# Patient Record
Sex: MALE | Race: OTHER | NOT HISPANIC OR LATINO | URBAN - METROPOLITAN AREA
[De-identification: names, ages, dates, MRNs, and addresses within clinical notes are randomized per-mention and may not be internally consistent; named-entity substitution may affect disease eponyms.]

---

## 2020-12-28 ENCOUNTER — OFFICE (OUTPATIENT)
Dept: URBAN - METROPOLITAN AREA TELEHEALTH 12 | Facility: TELEHEALTH | Age: 40
End: 2020-12-28
Payer: MEDICAID

## 2020-12-28 VITALS — WEIGHT: 160 LBS | HEIGHT: 71 IN

## 2020-12-28 DIAGNOSIS — K92.2 GASTROINTESTINAL HEMORRHAGE, UNSPECIFIED: ICD-10-CM

## 2020-12-28 DIAGNOSIS — K21.00 GASTRO-ESOPHAGEAL REFLUX DISEASE WITH ESOPHAGITIS, WITHOUT B: ICD-10-CM

## 2020-12-28 DIAGNOSIS — D64.9 ANEMIA, UNSPECIFIED: ICD-10-CM

## 2020-12-28 DIAGNOSIS — R07.89 OTHER CHEST PAIN: ICD-10-CM

## 2020-12-28 DIAGNOSIS — R10.12 LEFT UPPER QUADRANT PAIN: ICD-10-CM

## 2020-12-28 PROCEDURE — 99203 OFFICE O/P NEW LOW 30 MIN: CPT | Mod: 95 | Performed by: INTERNAL MEDICINE

## 2020-12-28 RX ORDER — ESOMEPRAZOLE MAGNESIUM 40 MG/1
CAPSULE, DELAYED RELEASE ORAL
Qty: 30 | Refills: 5 | Status: ACTIVE
Start: 2020-12-28

## 2020-12-28 NOTE — SERVICENOTES
Visit was 30 min long, Patient's visit was conducted through video telecommunication. Patient consented before the start of visit as to understanding of privacy concerns, possible technological failure, and their responsibility of carrying out instructions of plan.

## 2020-12-28 NOTE — SERVICEHPINOTES
PATIENT VERIFIED BY DATE OF BIRTH AND NAME. Patient has been consented for this telecommunication visit. Describes 4 months of intermittent chest pain, started 3 months ago.   TRigger foods include coffee, soda which tends to bring on the chest pain.  He then feels like he has shortness of breath and then he feels like he has to "yawn," which tends to help with his breathing.  He also feels a numbness in his arms.  Says he has luq pain which happens intermittently.  His heartburn and luq pain is bought on by caffeine, chocolate, mint and spicy foods which he avoids.  He was boxing with a friend and sustained a rib fracture foundon xray through visit to ER for left sided chest pain. Due to ongong pain returned to ER on 12/13, chest xray neg for acute process, labs show hgb of 12.8 (normal 13.5-17.5) with normal MCV.  No dysphagia, unintentional weight loss, nausea or emesis. He also describes intermittent palpitations of his heart- reports seeing a cardiologist and says has had a sleep study and "breathing test," done through [employer], he is not sure he had a stress test done- thinks it was Dr. Shelley who he saw. Notes blood on his toilet paper over the last few years.  Describes normal brown movements for the most part, no diarrhea or constipation. No previous EGD or colonoscopy.

## 2021-02-08 ENCOUNTER — ON CAMPUS - OUTPATIENT (OUTPATIENT)
Dept: URBAN - METROPOLITAN AREA HOSPITAL 16 | Facility: HOSPITAL | Age: 41
End: 2021-02-08
Payer: MEDICAID

## 2021-02-08 DIAGNOSIS — K92.2 GASTROINTESTINAL HEMORRHAGE, UNSPECIFIED: ICD-10-CM

## 2021-02-08 DIAGNOSIS — R07.89 OTHER CHEST PAIN: ICD-10-CM

## 2021-02-08 DIAGNOSIS — K21.00 GASTRO-ESOPHAGEAL REFLUX DISEASE WITH ESOPHAGITIS, WITHOUT B: ICD-10-CM

## 2021-02-08 DIAGNOSIS — D12.3 BENIGN NEOPLASM OF TRANSVERSE COLON: ICD-10-CM

## 2021-02-08 DIAGNOSIS — K29.60 OTHER GASTRITIS WITHOUT BLEEDING: ICD-10-CM

## 2021-02-08 DIAGNOSIS — K63.5 POLYP OF COLON: ICD-10-CM

## 2021-02-08 PROCEDURE — 45385 COLONOSCOPY W/LESION REMOVAL: CPT | Performed by: INTERNAL MEDICINE

## 2021-02-08 PROCEDURE — 45380 COLONOSCOPY AND BIOPSY: CPT | Mod: 59 | Performed by: INTERNAL MEDICINE

## 2021-02-08 PROCEDURE — 43239 EGD BIOPSY SINGLE/MULTIPLE: CPT | Performed by: INTERNAL MEDICINE
